# Patient Record
Sex: FEMALE | URBAN - METROPOLITAN AREA
[De-identification: names, ages, dates, MRNs, and addresses within clinical notes are randomized per-mention and may not be internally consistent; named-entity substitution may affect disease eponyms.]

---

## 2022-05-28 ENCOUNTER — WALK IN (OUTPATIENT)
Dept: URGENT CARE | Age: 77
End: 2022-05-28

## 2022-05-28 VITALS
SYSTOLIC BLOOD PRESSURE: 168 MMHG | DIASTOLIC BLOOD PRESSURE: 80 MMHG | TEMPERATURE: 97.6 F | HEART RATE: 92 BPM | RESPIRATION RATE: 20 BRPM | OXYGEN SATURATION: 100 %

## 2022-05-28 DIAGNOSIS — F43.0 ACUTE STRESS REACTION: Primary | ICD-10-CM

## 2022-05-28 PROCEDURE — 99214 OFFICE O/P EST MOD 30 MIN: CPT | Performed by: EMERGENCY MEDICINE

## 2022-05-28 RX ORDER — LORAZEPAM 0.5 MG/1
0.5 TABLET ORAL EVERY 6 HOURS PRN
Qty: 12 TABLET | Refills: 0 | Status: SHIPPED | OUTPATIENT
Start: 2022-05-28

## 2022-05-28 ASSESSMENT — PAIN SCALES - GENERAL: PAINLEVEL: 0

## 2025-06-20 ENCOUNTER — OFFICE VISIT (OUTPATIENT)
Dept: FAMILY MEDICINE CLINIC | Facility: CLINIC | Age: 80
End: 2025-06-20

## 2025-06-20 VITALS — DIASTOLIC BLOOD PRESSURE: 71 MMHG | HEART RATE: 84 BPM | SYSTOLIC BLOOD PRESSURE: 127 MMHG | WEIGHT: 156 LBS

## 2025-06-20 DIAGNOSIS — I10 ESSENTIAL HYPERTENSION: ICD-10-CM

## 2025-06-20 DIAGNOSIS — Z78.0 POST-MENOPAUSAL: ICD-10-CM

## 2025-06-20 DIAGNOSIS — I83.90 VARICOSE VEINS: ICD-10-CM

## 2025-06-20 DIAGNOSIS — M79.89 SWELLING OF LEFT LOWER EXTREMITY: ICD-10-CM

## 2025-06-20 DIAGNOSIS — B35.1 ONYCHOMYCOSIS: Primary | ICD-10-CM

## 2025-06-20 DIAGNOSIS — M79.89 SWELLING OF LOWER EXTREMITY: ICD-10-CM

## 2025-06-20 PROBLEM — E55.9 VITAMIN D DEFICIENCY: Status: ACTIVE | Noted: 2023-02-26

## 2025-06-20 PROBLEM — M81.0 AGE-RELATED OSTEOPOROSIS WITHOUT CURRENT PATHOLOGICAL FRACTURE: Status: ACTIVE | Noted: 2025-06-20

## 2025-06-20 PROCEDURE — 99204 OFFICE O/P NEW MOD 45 MIN: CPT | Performed by: FAMILY MEDICINE

## 2025-06-20 RX ORDER — LOSARTAN POTASSIUM AND HYDROCHLOROTHIAZIDE 12.5; 1 MG/1; MG/1
1 TABLET ORAL DAILY
COMMUNITY
Start: 2025-03-26 | End: 2025-06-20

## 2025-06-20 RX ORDER — ERGOCALCIFEROL 1.25 MG/1
50000 CAPSULE, LIQUID FILLED ORAL WEEKLY
Qty: 12 CAPSULE | Refills: 1 | Status: SHIPPED | OUTPATIENT
Start: 2025-06-20

## 2025-06-20 RX ORDER — ERGOCALCIFEROL 1.25 MG/1
50000 CAPSULE, LIQUID FILLED ORAL WEEKLY
COMMUNITY
End: 2025-06-20

## 2025-06-20 RX ORDER — LOSARTAN POTASSIUM AND HYDROCHLOROTHIAZIDE 25; 100 MG/1; MG/1
1 TABLET ORAL DAILY
Qty: 90 TABLET | Refills: 3 | Status: SHIPPED | OUTPATIENT
Start: 2025-06-20 | End: 2026-06-15

## 2025-06-20 RX ORDER — TERBINAFINE HYDROCHLORIDE 250 MG/1
250 TABLET ORAL DAILY
Qty: 90 TABLET | Refills: 0 | Status: SHIPPED | OUTPATIENT
Start: 2025-06-20 | End: 2025-09-18

## 2025-06-20 RX ORDER — AMLODIPINE BESYLATE 5 MG/1
5 TABLET ORAL DAILY
COMMUNITY
Start: 2025-03-26 | End: 2025-06-20

## 2025-06-20 NOTE — PROGRESS NOTES
HPI:   Shaina Nguyen is a 79 year old female who presents for an est care visit.   Patient has a history of essential hypertension and takes losartan hydrochlorothiazide   Has also noted some left lower extremity swelling  Concerned about varicose and scattered spider veins on both legs.   Has been taking amlodipine  Wt Readings from Last 3 Encounters:   06/20/25 156 lb (70.8 kg)     There is no height or weight on file to calculate BMI.       Current Medications[1]   Past Medical History[2]   Past Surgical History[3]   Family History[4]   Social History:   Short Social Hx on File[5]       REVIEW OF SYSTEMS:   Negative, except per HPI.     EXAM:   /71 (BP Location: Right arm, Patient Position: Sitting, Cuff Size: adult)   Pulse 84   Wt 156 lb (70.8 kg)     GENERAL: well developed, well nourished, in no apparent distress  SKIN: no rashes, no suspicious lesions  HEENT: atraumatic, normocephalic, ears are clear  EYES: PERRLA, EOMI,conjunctiva are clear  NECK: supple, no adenopathy  EXTREMITIES: Mild lower extremity swelling appreciated.  Scattered varicose veins spider veins bilateral lower extremities  ASSESSMENT AND PLAN:   1. Onychomycosis  Check liver function at the 6-week david  - terbinafine 250 MG Oral Tab; Take 1 tablet (250 mg total) by mouth daily.  Dispense: 90 tablet; Refill: 0  - Hepatic Function Panel (7) [E]; Future    2. Post-menopausal    - XR DEXA BONE DENSITOMETRY (CPT=77080); Future    3. Essential hypertension  Discontinued amlodipine and increased diuretic by 12.5 mg  - losartan-hydroCHLOROthiazide 100-25 MG Oral Tab; Take 1 tablet by mouth daily.  Dispense: 90 tablet; Refill: 3    4. Swelling of lower extremity    - SPECIALTY (OTHER) - EXTERNAL  - Specialty Other Referral - In Network    5. Varicose veins    - SPECIALTY (OTHER) - EXTERNAL  - Specialty Other Referral - In Network    6. Swelling of left lower extremity    - SPECIALTY (OTHER) - EXTERNAL  - Specialty Other Referral - In  Network       Jess Acuna MD  6/20/2025  1:38 PM             [1]   Current Outpatient Medications   Medication Sig Dispense Refill    amLODIPine 5 MG Oral Tab Take 1 tablet (5 mg total) by mouth daily.      Losartan Potassium-HCTZ 100-12.5 MG Oral Tab Take 1 tablet by mouth daily.      ergocalciferol 1.25 MG (32471 UT) Oral Cap Take 1 capsule (50,000 Units total) by mouth once a week.     [2] History reviewed. No pertinent past medical history.  [3]   Past Surgical History:  Procedure Laterality Date    Cataract      Hysterectomy      Other surgical history     [4]   Family History  Problem Relation Age of Onset    Dementia Mother    [5]   Social History  Socioeconomic History    Marital status:    Tobacco Use    Smoking status: Never     Passive exposure: Never    Smokeless tobacco: Never   Substance and Sexual Activity    Alcohol use: Not Currently